# Patient Record
Sex: FEMALE | Race: WHITE | Employment: FULL TIME | URBAN - METROPOLITAN AREA
[De-identification: names, ages, dates, MRNs, and addresses within clinical notes are randomized per-mention and may not be internally consistent; named-entity substitution may affect disease eponyms.]

---

## 2018-11-29 ENCOUNTER — OFFICE VISIT (OUTPATIENT)
Dept: FAMILY MEDICINE CLINIC | Facility: CLINIC | Age: 28
End: 2018-11-29
Payer: COMMERCIAL

## 2018-11-29 ENCOUNTER — LAB ENCOUNTER (OUTPATIENT)
Dept: LAB | Age: 28
End: 2018-11-29
Attending: FAMILY MEDICINE
Payer: COMMERCIAL

## 2018-11-29 VITALS
DIASTOLIC BLOOD PRESSURE: 66 MMHG | HEIGHT: 67.91 IN | HEART RATE: 90 BPM | SYSTOLIC BLOOD PRESSURE: 110 MMHG | BODY MASS INDEX: 16.4 KG/M2 | WEIGHT: 107 LBS

## 2018-11-29 DIAGNOSIS — Z00.00 ROUTINE GENERAL MEDICAL EXAMINATION AT A HEALTH CARE FACILITY: Primary | ICD-10-CM

## 2018-11-29 DIAGNOSIS — Z00.00 LABORATORY EXAM ORDERED AS PART OF ROUTINE GENERAL MEDICAL EXAMINATION: ICD-10-CM

## 2018-11-29 DIAGNOSIS — Z28.21 INFLUENZA VACCINATION DECLINED BY PATIENT: ICD-10-CM

## 2018-11-29 DIAGNOSIS — Z78.9 NONSMOKER: ICD-10-CM

## 2018-11-29 DIAGNOSIS — Z30.011 INITIATION OF OCP (BCP): ICD-10-CM

## 2018-11-29 PROCEDURE — 80061 LIPID PANEL: CPT | Performed by: FAMILY MEDICINE

## 2018-11-29 PROCEDURE — 84439 ASSAY OF FREE THYROXINE: CPT | Performed by: FAMILY MEDICINE

## 2018-11-29 PROCEDURE — 99385 PREV VISIT NEW AGE 18-39: CPT | Performed by: FAMILY MEDICINE

## 2018-11-29 PROCEDURE — 80050 GENERAL HEALTH PANEL: CPT | Performed by: FAMILY MEDICINE

## 2018-11-29 PROCEDURE — 36415 COLL VENOUS BLD VENIPUNCTURE: CPT | Performed by: FAMILY MEDICINE

## 2018-11-29 RX ORDER — NORGESTIMATE AND ETHINYL ESTRADIOL 0.25-0.035
1 KIT ORAL DAILY
Qty: 1 PACKAGE | Refills: 11 | Status: SHIPPED | OUTPATIENT
Start: 2018-11-29 | End: 2019-11-08

## 2018-11-29 NOTE — PATIENT INSTRUCTIONS
If you do well with the OCP, call to let Dr. Keri Plaza know so we can reorder it as a 3 month supply with refills.     Use, risks and benefits of hormonal contraception discussed including blood clot that can go to the lungs, heart or brain and ca Depression All women in this age group At routine exams   Type 2 diabates, prediabetes All women with no symptoms who are overweight or obese and have 1 or more other risk factors for diabetes At least every 3 years.  Also, testing for diabetes during pregn Human papillomavirus (HPV) All women in this age group up to age 32 3 doses; the second dose should be given 1 to 2 months after the first dose and the third dose given 6 months after the first dose   Influenza (flu) All women in this age group Once a year 1According to the ACS, women ages 21 to 44 years should have a clinical breast exam (CBE) as part of their routine health exam every 3 years. Breast self-exams are an option for women starting in their 25s.  But the USPSTF does not recommend CBE.   2Those wh

## 2018-11-29 NOTE — PROGRESS NOTES
HPI:   Jose Daniel Alvarado is a 34year old female who presents for her annual wellness visit. Symptoms: denies discharge, itching, burning or dysuria, periods are regular.    Last PAP: 2016  Abnormal PAP: no  Sexually active: yes   FDLMP: 11/8/2018, normal denies headaches, tingling or dizziness  PSYCH: denies depression or anxiety  HEMATOLOGIC: denies bleeding abnormalities  ENDOCRINE: denies temperature intolerance, polyuria, or excessive sweating.   LYMPHATICS: denies swollen glands      EXAM:   /66 weight loss. Advised self breast exam once a month. Age-appropriate calcium and vitamin D intake discussed. 2. Laboratory exam ordered as part of routine general medical examination  - LIPID PANEL; Future  - COMP METABOLIC PANEL (14);  Future  - CBC W

## 2019-11-08 DIAGNOSIS — Z30.011 INITIATION OF OCP (BCP): ICD-10-CM

## 2019-11-11 RX ORDER — NORGESTIMATE AND ETHINYL ESTRADIOL 0.25-0.035
1 KIT ORAL DAILY
Qty: 28 TABLET | Refills: 0 | Status: SHIPPED | OUTPATIENT
Start: 2019-11-11

## 2019-11-11 NOTE — TELEPHONE ENCOUNTER
Pt requesting refill ofESTARYLLA 0.25-35 MG-MCG Oral Tab , refill approved, sent to pharmacy with message to schedule an appointment     Last Time Medication was Filled:  11/29/18    Last Office Visit with PCP: 11/29/18       No future appointments.

## 2019-12-10 DIAGNOSIS — Z30.011 INITIATION OF OCP (BCP): ICD-10-CM

## 2019-12-10 RX ORDER — NORGESTIMATE AND ETHINYL ESTRADIOL 0.25-0.035
KIT ORAL
Qty: 28 TABLET | Refills: 0 | OUTPATIENT
Start: 2019-12-10

## 2020-03-19 ENCOUNTER — HOSPITAL ENCOUNTER (OUTPATIENT)
Dept: ULTRASOUND IMAGING | Age: 30
Discharge: HOME | End: 2020-03-19
Attending: SURGERY

## 2020-03-19 DIAGNOSIS — N63.0 LUMP IN FEMALE BREAST: ICD-10-CM

## 2020-03-19 PROCEDURE — 76642 ULTRASOUND BREAST LIMITED: CPT

## 2020-04-22 ENCOUNTER — HOSPITAL ENCOUNTER (OUTPATIENT)
Dept: ULTRASOUND IMAGING | Age: 30
End: 2020-04-22
Attending: SURGERY

## 2020-04-23 ENCOUNTER — TELEPHONE (OUTPATIENT)
Dept: SURGERY | Age: 30
End: 2020-04-23

## 2020-04-27 ENCOUNTER — APPOINTMENT (OUTPATIENT)
Dept: SURGERY | Age: 30
End: 2020-04-27
Attending: SURGERY

## 2020-06-01 ENCOUNTER — HOSPITAL ENCOUNTER (OUTPATIENT)
Dept: MAMMOGRAPHY | Age: 30
Discharge: HOME | End: 2020-06-01
Attending: SURGERY

## 2020-06-01 ENCOUNTER — OFFICE VISIT (OUTPATIENT)
Dept: SURGERY | Age: 30
End: 2020-06-01
Attending: SURGERY

## 2020-06-01 ENCOUNTER — HOSPITAL ENCOUNTER (OUTPATIENT)
Dept: ULTRASOUND IMAGING | Age: 30
Discharge: HOME | End: 2020-06-01
Attending: SURGERY

## 2020-06-01 VITALS — BODY MASS INDEX: 17.27 KG/M2 | WEIGHT: 110 LBS | HEIGHT: 67 IN

## 2020-06-01 DIAGNOSIS — N63.0 BREAST MASS: ICD-10-CM

## 2020-06-01 DIAGNOSIS — N63.0 BREAST MASS: Primary | ICD-10-CM

## 2020-06-01 PROCEDURE — 19084 BX BREAST ADD LESION US IMAG: CPT

## 2020-06-01 PROCEDURE — 10006027 HB SUPPLY 278

## 2020-06-01 PROCEDURE — A4648 IMPLANTABLE TISSUE MARKER: HCPCS

## 2020-06-01 PROCEDURE — 19083 BX BREAST 1ST LESION US IMAG: CPT

## 2020-06-01 PROCEDURE — 88305 TISSUE EXAM BY PATHOLOGIST: CPT

## 2020-06-01 PROCEDURE — 99244 OFF/OP CNSLTJ NEW/EST MOD 40: CPT | Performed by: SURGERY

## 2020-06-01 RX ORDER — LIDOCAINE HYDROCHLORIDE 10 MG/ML
10 INJECTION, SOLUTION INFILTRATION; PERINEURAL ONCE
Status: DISCONTINUED | OUTPATIENT
Start: 2020-06-01 | End: 2020-06-03 | Stop reason: HOSPADM

## 2020-06-02 ENCOUNTER — TELEPHONE (OUTPATIENT)
Dept: SURGERY | Age: 30
End: 2020-06-02

## 2020-06-02 LAB — PATHOLOGIST NAME: NORMAL

## 2020-06-03 ENCOUNTER — APPOINTMENT (OUTPATIENT)
Dept: ULTRASOUND IMAGING | Age: 30
End: 2020-06-03
Attending: SURGERY

## 2020-06-03 ENCOUNTER — APPOINTMENT (OUTPATIENT)
Dept: SURGERY | Age: 30
End: 2020-06-03

## 2020-06-22 ENCOUNTER — OFFICE VISIT (OUTPATIENT)
Dept: SURGERY | Age: 30
End: 2020-06-22
Attending: SURGERY

## 2020-06-22 DIAGNOSIS — N63.0 BREAST MASS: Primary | ICD-10-CM

## 2020-06-22 PROCEDURE — 99213 OFFICE O/P EST LOW 20 MIN: CPT | Performed by: SURGERY

## 2020-06-23 ENCOUNTER — TELEPHONE (OUTPATIENT)
Dept: SURGERY | Age: 30
End: 2020-06-23

## 2020-06-23 DIAGNOSIS — N63.0 BREAST MASS: Primary | ICD-10-CM

## 2020-06-25 ENCOUNTER — TELEPHONE (OUTPATIENT)
Dept: SURGERY | Age: 30
End: 2020-06-25

## 2020-06-29 ENCOUNTER — TELEPHONE (OUTPATIENT)
Dept: SURGERY | Age: 30
End: 2020-06-29

## 2020-08-17 ENCOUNTER — TELEPHONE (OUTPATIENT)
Dept: SURGERY | Age: 30
End: 2020-08-17

## 2020-10-31 ENCOUNTER — OFFICE VISIT (OUTPATIENT)
Dept: FAMILY MEDICINE CLINIC | Facility: CLINIC | Age: 30
End: 2020-10-31
Payer: COMMERCIAL

## 2020-10-31 VITALS
DIASTOLIC BLOOD PRESSURE: 76 MMHG | SYSTOLIC BLOOD PRESSURE: 112 MMHG | BODY MASS INDEX: 17.11 KG/M2 | OXYGEN SATURATION: 100 % | TEMPERATURE: 98 F | WEIGHT: 109 LBS | HEIGHT: 67 IN | HEART RATE: 76 BPM | RESPIRATION RATE: 18 BRPM

## 2020-10-31 DIAGNOSIS — T70.29XA BAROTRAUMA, INITIAL ENCOUNTER: Primary | ICD-10-CM

## 2020-10-31 PROCEDURE — 3008F BODY MASS INDEX DOCD: CPT | Performed by: NURSE PRACTITIONER

## 2020-10-31 PROCEDURE — 3074F SYST BP LT 130 MM HG: CPT | Performed by: NURSE PRACTITIONER

## 2020-10-31 PROCEDURE — 99213 OFFICE O/P EST LOW 20 MIN: CPT | Performed by: NURSE PRACTITIONER

## 2020-10-31 PROCEDURE — 3078F DIAST BP <80 MM HG: CPT | Performed by: NURSE PRACTITIONER

## 2020-10-31 NOTE — PATIENT INSTRUCTIONS
Ear Barotrauma    Ear barotrauma is a problem of the middle ear and eardrum. It occurs when unequal pressures form inside the middle ear and outside the eardrum. This often happens with altitude changes such as during an airplane flight or scuba diving. · When flying, do not sleep during the descent. Use the self-care steps listed above to help open the eustachian tubes. · When scuba diving, descend and ascend slowly. Diving when you have any sinus, ear, or nasal congestion is not recommended.   Follow-up

## 2020-10-31 NOTE — PROGRESS NOTES
CHIEF COMPLAINT:   Patient presents with:  Hard Of Hearing: WENT TO [facility] ON  10/23 NOW UNBALE TO HEAR OUT OF RT EAR X 1 WK NO PAIN       HPI:   Froy Varela is a 31year old female who presents to clinic today with complaints of decreased hearin EARS: Tragus non tender on palpation bilaterally. External auditory canals healthy. Right TM: erythemic, no bulging, no retraction, small effusion, bony landmarks visible.   Left TM: clear, no bulging, no  Retraction,no obsvious effusion, bony landmarks vis Symptoms include pressure and pain in the ears. You may feel dizzy. Your ears may feel plugged. You may also have short-term hearing loss. In severe cases, the eardrum may rupture. This can lead to bleeding and infection.   Often, self-care is all that is n Call your healthcare provider right away if any of these occur:  · Fever of 100.4°F (38°C) or higher, or as directed by your healthcare provider  · Increasing pain or ringing in your ears  · Hearing loss that lasts longer than 2 days  · Fluid or blood drai

## 2022-03-21 ENCOUNTER — OFFICE VISIT (OUTPATIENT)
Dept: FAMILY MEDICINE CLINIC | Facility: CLINIC | Age: 32
End: 2022-03-21

## 2022-03-21 VITALS
OXYGEN SATURATION: 99 % | TEMPERATURE: 99 F | BODY MASS INDEX: 18.05 KG/M2 | RESPIRATION RATE: 16 BRPM | SYSTOLIC BLOOD PRESSURE: 106 MMHG | HEIGHT: 67 IN | DIASTOLIC BLOOD PRESSURE: 60 MMHG | HEART RATE: 90 BPM | WEIGHT: 115 LBS

## 2022-03-21 DIAGNOSIS — J02.0 STREP THROAT: Primary | ICD-10-CM

## 2022-03-21 DIAGNOSIS — H65.91 FLUID LEVEL BEHIND TYMPANIC MEMBRANE OF RIGHT EAR: ICD-10-CM

## 2022-03-21 LAB
CONTROL LINE PRESENT WITH A CLEAR BACKGROUND (YES/NO): YES YES/NO
STREP GRP A CUL-SCR: POSITIVE

## 2022-03-21 PROCEDURE — 3008F BODY MASS INDEX DOCD: CPT | Performed by: NURSE PRACTITIONER

## 2022-03-21 PROCEDURE — 99213 OFFICE O/P EST LOW 20 MIN: CPT | Performed by: NURSE PRACTITIONER

## 2022-03-21 PROCEDURE — 3074F SYST BP LT 130 MM HG: CPT | Performed by: NURSE PRACTITIONER

## 2022-03-21 PROCEDURE — 87880 STREP A ASSAY W/OPTIC: CPT | Performed by: NURSE PRACTITIONER

## 2022-03-21 PROCEDURE — 3078F DIAST BP <80 MM HG: CPT | Performed by: NURSE PRACTITIONER

## 2022-03-21 RX ORDER — AMOXICILLIN 500 MG/1
500 CAPSULE ORAL 3 TIMES DAILY
Qty: 30 CAPSULE | Refills: 0 | Status: SHIPPED | OUTPATIENT
Start: 2022-03-21 | End: 2022-03-31

## 2022-03-21 RX ORDER — DROSPIRENONE AND ETHINYL ESTRADIOL 0.02-3(28)
KIT ORAL
COMMUNITY
Start: 2022-01-26